# Patient Record
Sex: FEMALE | Race: WHITE | NOT HISPANIC OR LATINO | Employment: PART TIME | URBAN - METROPOLITAN AREA
[De-identification: names, ages, dates, MRNs, and addresses within clinical notes are randomized per-mention and may not be internally consistent; named-entity substitution may affect disease eponyms.]

---

## 2021-02-27 ENCOUNTER — HOSPITAL ENCOUNTER (EMERGENCY)
Facility: HOSPITAL | Age: 66
Discharge: HOME | End: 2021-02-28
Attending: EMERGENCY MEDICINE | Admitting: EMERGENCY MEDICINE
Payer: COMMERCIAL

## 2021-02-27 DIAGNOSIS — S91.119A TOE LACERATION: Primary | ICD-10-CM

## 2021-02-27 PROCEDURE — 99282 EMERGENCY DEPT VISIT SF MDM: CPT

## 2021-02-27 NOTE — Clinical Note
Jaida Alberts was seen and treated in our emergency department on 2/27/2021  Diagnosis:     Sera Alonso  may return to work on return date  She may return on this date: 03/07/2021         If you have any questions or concerns, please don't hesitate to call        Zacarias Coronado MD    ______________________________           _______________          _______________  Hospital Representative                              Date                                Time

## 2021-02-28 VITALS
SYSTOLIC BLOOD PRESSURE: 137 MMHG | HEART RATE: 87 BPM | OXYGEN SATURATION: 94 % | DIASTOLIC BLOOD PRESSURE: 62 MMHG | TEMPERATURE: 98 F | RESPIRATION RATE: 16 BRPM

## 2021-02-28 PROCEDURE — 99282 EMERGENCY DEPT VISIT SF MDM: CPT | Performed by: EMERGENCY MEDICINE

## 2021-02-28 RX ORDER — LIDOCAINE HYDROCHLORIDE 10 MG/ML
10 INJECTION, SOLUTION EPIDURAL; INFILTRATION; INTRACAUDAL; PERINEURAL ONCE
Status: COMPLETED | OUTPATIENT
Start: 2021-02-28 | End: 2021-02-28

## 2021-02-28 RX ADMIN — LIDOCAINE HYDROCHLORIDE 10 ML: 10 INJECTION, SOLUTION EPIDURAL; INFILTRATION; INTRACAUDAL; PERINEURAL at 00:30

## 2021-03-07 ENCOUNTER — HOSPITAL ENCOUNTER (EMERGENCY)
Facility: HOSPITAL | Age: 66
Discharge: HOME | End: 2021-03-07
Attending: EMERGENCY MEDICINE
Payer: COMMERCIAL

## 2021-03-07 VITALS
RESPIRATION RATE: 16 BRPM | TEMPERATURE: 98.6 F | HEART RATE: 90 BPM | WEIGHT: 148 LBS | DIASTOLIC BLOOD PRESSURE: 88 MMHG | OXYGEN SATURATION: 97 % | SYSTOLIC BLOOD PRESSURE: 135 MMHG

## 2021-03-07 DIAGNOSIS — Z48.02 VISIT FOR SUTURE REMOVAL: ICD-10-CM

## 2021-03-07 DIAGNOSIS — T14.8XXA HEMATOMA: Primary | ICD-10-CM

## 2021-03-07 PROCEDURE — 99283 EMERGENCY DEPT VISIT LOW MDM: CPT

## 2021-03-07 PROCEDURE — 99284 EMERGENCY DEPT VISIT MOD MDM: CPT | Performed by: EMERGENCY MEDICINE

## 2021-03-07 NOTE — Clinical Note
Moncho Charles was seen and treated in our emergency department on 3/7/2021  Diagnosis:     Santy Melendez    She may return on this date: 03/15/2021         If you have any questions or concerns, please don't hesitate to call        Huy Hernandez MD    ______________________________           _______________          _______________  Hospital Representative                              Date                                Time

## 2021-03-07 NOTE — ED PROVIDER NOTES
History  Chief Complaint   Patient presents with    Leg Swelling     c/o right leg swelling and brusing after fall and toe injury     61 yo female with fall and R leg injury approximately 10 days ago who comes to ED c/o 10 days of pain in R leg with progressively worsening swelling and bruising most pronounced in the R anterior leg/shin area  Has been walking on her leg and notes walking and standing on it worsens her discomfort  Pain is mild to moderate, worse with palpation and ambulation, very mild at rest and when not ambulating  No cp, sob, cough, hemoptysis, syncope or presyncope  No thigh swelling or pain  Not anticoagulated  Additional history per family member at bedside  None       History reviewed  No pertinent past medical history  History reviewed  No pertinent surgical history  History reviewed  No pertinent family history  I have reviewed and agree with the history as documented  E-Cigarette/Vaping     E-Cigarette/Vaping Substances     Social History     Tobacco Use    Smoking status: Never Smoker    Smokeless tobacco: Never Used   Substance Use Topics    Alcohol use: Not Currently    Drug use: Not Currently       Review of Systems   Cardiovascular: Positive for leg swelling  All other systems reviewed and are negative  Physical Exam  Physical Exam  Vitals signs and nursing note reviewed  Constitutional:       General: She is not in acute distress  Appearance: Normal appearance  She is well-developed  She is not ill-appearing, toxic-appearing or diaphoretic  HENT:      Head: Normocephalic and atraumatic  Eyes:      General:         Right eye: No discharge  Left eye: No discharge  Conjunctiva/sclera: Conjunctivae normal       Pupils: Pupils are equal, round, and reactive to light  Neck:      Musculoskeletal: Normal range of motion and neck supple  Vascular: No JVD  Pulmonary:      Effort: No respiratory distress  Breath sounds:  No stridor  Musculoskeletal: Normal range of motion  General: Swelling and tenderness present  No deformity  Right lower leg: Edema present  Comments: There is moderate swelling and tenderness as well as ecchymosis diffusely over the anterior R leg  The calf is soft and without evidence of compartment syndrome  There is a well healed wound at the plantar base of the 5th toe of the R foot  The R foot is warm and well perfused with normal sensation  There is no pain in the calf from passive ROM of the R ankle  Skin:     General: Skin is warm and dry  Capillary Refill: Capillary refill takes less than 2 seconds  Neurological:      General: No focal deficit present  Mental Status: She is alert and oriented to person, place, and time  Cranial Nerves: No cranial nerve deficit  Sensory: No sensory deficit  Motor: No abnormal muscle tone  Coordination: Coordination normal          Vital Signs  ED Triage Vitals [03/07/21 1449]   Temperature Pulse Respirations Blood Pressure SpO2   98 6 °F (37 °C) 90 16 135/88 97 %      Temp src Heart Rate Source Patient Position - Orthostatic VS BP Location FiO2 (%)   -- Monitor Sitting Left arm --      Pain Score       --           Vitals:    03/07/21 1449   BP: 135/88   Pulse: 90   Patient Position - Orthostatic VS: Sitting         Visual Acuity      ED Medications  Medications - No data to display    Diagnostic Studies  Results Reviewed     None                 No orders to display              Procedures  Procedures  2 sutures removed by me from base of R 5th toe  Pt tolerated procedure well  ED Course                                           MDM    Disposition  Final diagnoses:   Hematoma   Visit for suture removal     Time reflects when diagnosis was documented in both MDM as applicable and the Disposition within this note     Time User Action Codes Description Comment    3/7/2021  3:11 PM [place], [address] [place] [address]  8XXA] Hematoma 3/7/2021  3:11 PM Mehdi Barkley Add [Z48 02] Visit for suture removal       ED Disposition     ED Disposition Condition Date/Time Comment    Discharge Stable Sun Mar 7, 2021  3:11 PM Sarahi Christy discharge to home/self care  Follow-up Information    None         There are no discharge medications for this patient  No discharge procedures on file      PDMP Review     None          ED Provider  Electronically Signed by           Ash Gilliam MD  03/07/21 1699

## 2021-03-11 NOTE — ED PROVIDER NOTES
History  Chief Complaint   Patient presents with    Toe Laceration     Patient stated that she fell and cut her foot on a metal container  Noted laceration of right 5ft digit  bleeding controlled at this time  Tetanus update     61 yo female presenting to the emergency department for eval of toe injury  She had a mechanical fall and cut her right 5th toe on a sharp metal container  She has a laceration to the proximal ip joint of the toe  Bleeding is controlled and tetanus is utd  None       History reviewed  No pertinent past medical history  History reviewed  No pertinent surgical history  History reviewed  No pertinent family history  I have reviewed and agree with the history as documented  E-Cigarette/Vaping     E-Cigarette/Vaping Substances     Social History     Tobacco Use    Smoking status: Never Smoker    Smokeless tobacco: Never Used   Substance Use Topics    Alcohol use: Not Currently    Drug use: Not Currently       Review of Systems   Constitutional: Negative for appetite change, chills, fatigue and fever  HENT: Negative for sneezing and sore throat  Eyes: Negative for visual disturbance  Respiratory: Negative for cough, choking, chest tightness, shortness of breath and wheezing  Cardiovascular: Negative for chest pain and palpitations  Gastrointestinal: Negative for abdominal pain, constipation, diarrhea, nausea and vomiting  Genitourinary: Negative for difficulty urinating and dysuria  Skin: Positive for wound  Neurological: Negative for dizziness, weakness, light-headedness, numbness and headaches  All other systems reviewed and are negative  Physical Exam  Physical Exam  Vitals signs and nursing note reviewed  Constitutional:       General: She is not in acute distress  Appearance: She is well-developed  She is not diaphoretic  HENT:      Head: Normocephalic and atraumatic  Eyes:      Pupils: Pupils are equal, round, and reactive to light  Neck:      Vascular: No JVD  Trachea: No tracheal deviation  Cardiovascular:      Rate and Rhythm: Normal rate and regular rhythm  Heart sounds: Normal heart sounds  No murmur  No friction rub  No gallop  Pulmonary:      Effort: Pulmonary effort is normal  No respiratory distress  Breath sounds: Normal breath sounds  No wheezing or rales  Abdominal:      General: Bowel sounds are normal  There is no distension  Palpations: Abdomen is soft  Tenderness: There is no abdominal tenderness  There is no guarding or rebound  Skin:     General: Skin is warm and dry  Coloration: Skin is not pale  Comments: 1  Cm lac to base of r 5th toe   Neurological:      Mental Status: She is alert and oriented to person, place, and time  Cranial Nerves: No cranial nerve deficit  Motor: No abnormal muscle tone     Psychiatric:         Behavior: Behavior normal          Vital Signs  ED Triage Vitals [02/28/21 0002]   Temperature Pulse Respirations Blood Pressure SpO2   98 °F (36 7 °C) 87 16 137/62 94 %      Temp Source Heart Rate Source Patient Position - Orthostatic VS BP Location FiO2 (%)   Oral Monitor Sitting Right arm --      Pain Score       Worst Possible Pain           Vitals:    02/28/21 0002   BP: 137/62   Pulse: 87   Patient Position - Orthostatic VS: Sitting         Visual Acuity      ED Medications  Medications   lidocaine (PF) (XYLOCAINE-MPF) 1 % injection 10 mL (10 mL Infiltration Given 2/28/21 0030)       Diagnostic Studies  Results Reviewed     None                 No orders to display              Procedures  Laceration repair    Date/Time: 2/28/2021 1:30 AM  Performed by: Monisha Hollis MD  Authorized by: Monisha Hollis MD   Body area: lower extremity  Location details: right little toe  Laceration length: 1 cm  Foreign bodies: no foreign bodies  Tendon involvement: none  Nerve involvement: none  Vascular damage: no  Anesthesia: local infiltration    Anesthesia:  Local Anesthetic: lidocaine 1% without epinephrine    Wound Dehiscence:  Superficial Wound Dehiscence: simple closure      Procedure Details:  Irrigation solution: saline  Irrigation method: jet lavage  Amount of cleaning: standard  Debridement: none  Degree of undermining: none  Skin closure: 5-0 nylon  Number of sutures: 2  Technique: simple  Approximation: close  Approximation difficulty: simple  Dressing: 4x4 sterile gauze  Patient tolerance: patient tolerated the procedure well with no immediate complications               ED Course                             SBIRT 20yo+      Most Recent Value   SBIRT (22 yo +)   In order to provide better care to our patients, we are screening all of our patients for alcohol and drug use  Would it be okay to ask you these screening questions? No Filed at: 02/28/2021 0059   Initial Alcohol Screen: US AUDIT-C    1  How often do you have a drink containing alcohol?  0 Filed at: 02/28/2021 0059   2  How many drinks containing alcohol do you have on a typical day you are drinking? 0 Filed at: 02/28/2021 0059   3a  Male UNDER 65: How often do you have five or more drinks on one occasion? 0 Filed at: 02/28/2021 0059   3b  FEMALE Any Age, or MALE 65+: How often do you have 4 or more drinks on one occassion? 0 Filed at: 02/28/2021 0059   Audit-C Score  0 Filed at: 02/28/2021 0059                    [facility]  Number of Diagnoses or Management Options  Toe laceration:   Diagnosis management comments: 61 yo f with toe laceration repaired as described, will discharge with wound care instructions  Disposition  Final diagnoses:    Toe laceration     Time reflects when diagnosis was documented in both MDM as applicable and the Disposition within this note     Time User Action Codes Description Comment    2/28/2021  1:28 AM Belinda Sports Add [P14 688N] Toe laceration       ED Disposition     ED Disposition Condition Date/Time Comment    Discharge Stable Sun Feb 28, 2021  1:28 AM [place] discharge to home/self care  Follow-up Information     Follow up With Specialties Details Why Contact Info Additional Information    [facility] Emergency Department Emergency Medicine In 3 weeks For suture removal [facility] Emergency Department, [address] [place] [address], [id number]          There are no discharge medications for this patient  No discharge procedures on file      PDMP Review     None          ED Provider  Electronically Signed by           Sagrario Matthews MD  03/11/21 0105

## 2021-05-04 ENCOUNTER — HOSPITAL ENCOUNTER (EMERGENCY)
Facility: HOSPITAL | Age: 66
Discharge: HOME | End: 2021-05-04
Attending: EMERGENCY MEDICINE | Admitting: EMERGENCY MEDICINE
Payer: COMMERCIAL

## 2021-05-04 ENCOUNTER — APPOINTMENT (EMERGENCY)
Dept: RADIOLOGY | Facility: HOSPITAL | Age: 66
End: 2021-05-04
Payer: COMMERCIAL

## 2021-05-04 VITALS
OXYGEN SATURATION: 97 % | WEIGHT: 148.59 LBS | SYSTOLIC BLOOD PRESSURE: 142 MMHG | HEART RATE: 83 BPM | RESPIRATION RATE: 16 BRPM | TEMPERATURE: 98.4 F | DIASTOLIC BLOOD PRESSURE: 70 MMHG

## 2021-05-04 DIAGNOSIS — M77.8 TENDINITIS OF RIGHT SHOULDER: Primary | ICD-10-CM

## 2021-05-04 PROCEDURE — 99284 EMERGENCY DEPT VISIT MOD MDM: CPT | Performed by: NURSE PRACTITIONER

## 2021-05-04 PROCEDURE — 99283 EMERGENCY DEPT VISIT LOW MDM: CPT

## 2021-05-04 PROCEDURE — 73060 X-RAY EXAM OF HUMERUS: CPT

## 2021-05-04 RX ORDER — LIDOCAINE 50 MG/G
1 PATCH TOPICAL ONCE
Status: DISCONTINUED | OUTPATIENT
Start: 2021-05-04 | End: 2021-05-04 | Stop reason: HOSPADM

## 2021-05-04 RX ADMIN — LIDOCAINE 5% 1 PATCH: 700 PATCH TOPICAL at 11:35

## 2021-05-04 NOTE — ED PROVIDER NOTES
History  Chief Complaint   Patient presents with   Dinora Sharma Fall     pt states that she fell [de-identified] and c/o right arm pain since  denies any head injury or LOC     This is a pleasant 78-year-old female who presents here with a chief complaint of right shoulder pain and decreased range of motion due to pain since suffering from a fall on Saturday  She said falling for her is an occasional occurrence  She came today get her arm evaluated  She has tenderness of the upper arm limited range of motion because of pain  No obvious deformity  No distal edema  Reports that her arm was outstretched to brace her fall  Fall  Associated symptoms: no abdominal pain, no back pain, no chest pain, no headaches and no vomiting        None       Past Medical History:   Diagnosis Date    Asthma     COPD (chronic obstructive pulmonary disease) (HCC)     High cholesterol        Past Surgical History:   Procedure Laterality Date     SECTION      TONSILLECTOMY         History reviewed  No pertinent family history  I have reviewed and agree with the history as documented  E-Cigarette/Vaping    E-Cigarette Use Never User      E-Cigarette/Vaping Substances     Social History     Tobacco Use    Smoking status: Never Smoker    Smokeless tobacco: Never Used   Substance Use Topics    Alcohol use: Not Currently    Drug use: Not Currently       Review of Systems   Constitutional: Negative for diaphoresis, fatigue and fever  HENT: Negative for congestion, ear pain, nosebleeds and sore throat  Eyes: Negative for photophobia, pain, discharge and visual disturbance  Respiratory: Negative for cough, choking, chest tightness, shortness of breath and wheezing  Cardiovascular: Negative for chest pain and palpitations  Gastrointestinal: Negative for abdominal distention, abdominal pain, diarrhea and vomiting  Genitourinary: Negative for dysuria, flank pain and frequency     Musculoskeletal: Negative for back pain, gait problem and joint swelling  Skin: Negative for color change and rash  Neurological: Negative for dizziness, syncope and headaches  Psychiatric/Behavioral: Negative for behavioral problems and confusion  The patient is not nervous/anxious  All other systems reviewed and are negative  Physical Exam  Physical Exam  Vitals signs and nursing note reviewed  Constitutional:       General: She is not in acute distress  Appearance: She is well-developed  She is not ill-appearing or toxic-appearing  HENT:      Head: Normocephalic and atraumatic  Mouth/Throat:      Dentition: Normal dentition  Eyes:      General:         Right eye: No discharge  Left eye: No discharge  Neck:      Musculoskeletal: Normal range of motion and neck supple  Cardiovascular:      Rate and Rhythm: Normal rate and regular rhythm  Pulmonary:      Effort: Pulmonary effort is normal  No accessory muscle usage or respiratory distress  Abdominal:      General: There is no distension  Tenderness: There is no guarding  Musculoskeletal:      Right shoulder: She exhibits decreased range of motion (Due to pain, full passive range of motion), tenderness and pain  She exhibits no swelling and no deformity  Skin:     General: Skin is warm and dry  Neurological:      Mental Status: She is alert and oriented to person, place, and time  Coordination: Coordination normal    Psychiatric:         Behavior: Behavior is cooperative           Vital Signs  ED Triage Vitals [05/04/21 1011]   Temperature Pulse Respirations Blood Pressure SpO2   98 4 °F (36 9 °C) 84 16 144/74 97 %      Temp Source Heart Rate Source Patient Position - Orthostatic VS BP Location FiO2 (%)   Oral Monitor Sitting Left arm --      Pain Score       Worst Possible Pain           Vitals:    05/04/21 1011   BP: 144/74   Pulse: 84   Patient Position - Orthostatic VS: Sitting         Visual Acuity  Visual Acuity      Most Recent Value   L Pupil Size (mm)  4   R Pupil Size (mm)  4          ED Medications  Medications   lidocaine (LIDODERM) 5 % patch 1 patch (has no administration in time range)       Diagnostic Studies  Results Reviewed     None                 XR humerus RIGHT   ED Interpretation by SUE Arteaga (05/04 1122)   No acute osseous injury                 Procedures  Procedures         ED Course                             SBIRT 20yo+      Most Recent Value   SBIRT (23 yo +)   In order to provide better care to our patients, we are screening all of our patients for alcohol and drug use  Would it be okay to ask you these screening questions? Yes Filed at: 05/04/2021 1052   Initial Alcohol Screen: US AUDIT-C    1  How often do you have a drink containing alcohol?  0 Filed at: 05/04/2021 1052   2  How many drinks containing alcohol do you have on a typical day you are drinking? 0 Filed at: 05/04/2021 1052   3b  FEMALE Any Age, or MALE 65+: How often do you have 4 or more drinks on one occassion? 0 Filed at: 05/04/2021 1052   Audit-C Score  0 Filed at: 05/04/2021 1052   THEA: How many times in the past year have you    Used an illegal drug or used a prescription medication for non-medical reasons? Never Filed at: 05/04/2021 1052                    MDM  Number of Diagnoses or Management Options  Tendinitis of right shoulder: new and requires workup  Diagnosis management comments: No acute osseous injury on plain films  Given the mechanism of fall and distribution of pain likely has developed a shoulder tendinitis/rotator cuff tendinitis    Recommend follow-up with Orthopedics or Sports Medicine likely needs physical therapy       Amount and/or Complexity of Data Reviewed  Tests in the radiology section of CPT®: reviewed and ordered  Independent visualization of images, tracings, or specimens: yes    Patient Progress  Patient progress: stable      Disposition  Final diagnoses:   Tendinitis of right shoulder     Time reflects when diagnosis was documented in both MDM as applicable and the Disposition within this note     Time User Action Codes Description Comment    5/4/2021 11:26 AM [place]  Add [M77 8] Tendinitis of right shoulder       ED Disposition     ED Disposition Condition Date/Time Comment    Discharge Stable Tue May 4, 2021 11:26 AM Alessandro Truong discharge to home/self care  Follow-up Information     Follow up With Specialties Details Why [address], DO Sports Medicine Schedule an appointment as soon as possible for a visit  For Continued Evaluation [address] [phone]            Patient's Medications    No medications on file     No discharge procedures on file      PDMP Review     None          ED Provider  Electronically Signed by           SUE Rice  05/04/21 7991

## 2021-06-15 ENCOUNTER — OFFICE VISIT (OUTPATIENT)
Dept: OBGYN CLINIC | Facility: CLINIC | Age: 66
End: 2021-06-15
Payer: COMMERCIAL

## 2021-06-15 VITALS
SYSTOLIC BLOOD PRESSURE: 111 MMHG | DIASTOLIC BLOOD PRESSURE: 72 MMHG | HEIGHT: 61 IN | WEIGHT: 148 LBS | BODY MASS INDEX: 27.94 KG/M2 | HEART RATE: 78 BPM

## 2021-06-15 DIAGNOSIS — M24.811 INTERNAL DERANGEMENT OF RIGHT SHOULDER: ICD-10-CM

## 2021-06-15 DIAGNOSIS — M25.511 ACUTE PAIN OF RIGHT SHOULDER: ICD-10-CM

## 2021-06-15 DIAGNOSIS — S49.91XA INJURY OF RIGHT SHOULDER, INITIAL ENCOUNTER: Primary | ICD-10-CM

## 2021-06-15 PROCEDURE — 99203 OFFICE O/P NEW LOW 30 MIN: CPT | Performed by: ORTHOPAEDIC SURGERY

## 2021-06-15 RX ORDER — OMEPRAZOLE 40 MG/1
40 CAPSULE, DELAYED RELEASE ORAL DAILY
COMMUNITY

## 2021-06-15 RX ORDER — ROSUVASTATIN CALCIUM 40 MG/1
40 TABLET, COATED ORAL DAILY
COMMUNITY

## 2021-06-15 RX ORDER — MELATONIN
1000 DAILY
COMMUNITY

## 2021-06-15 RX ORDER — EZETIMIBE 10 MG/1
10 TABLET ORAL DAILY
COMMUNITY

## 2021-06-15 NOTE — PROGRESS NOTES
Orthopaedics Office Visit - New Patient Visit    ASSESSMENT/PLAN:    Assessment:   Suspected rotator cuff pathology, right     Plan:   Discussed PT/CSI and or MRI, declined PT and CSI    Bravo Marinelli elected to proceed with a right shoulder MRI, order was placed today, suspected rotator cuff pathology    Advised to continue to perform ROM/stretching exercises at home   MRI may be denied by insurance   Follow up once the MRI is complete     To Do Next Visit:  Review MRI results     _____________________________________________________  CHIEF COMPLAINT:  Chief Complaint   Patient presents with    Right Arm - Pain         SUBJECTIVE:  Maricruz Skinner is a 60 y o  female who presents who presents to the office today for right shoulder pain  Bravo Marinelli states on 2021 she had a fall directly onto her right shoulder  She presented to the ED a few days after the injury, at which time x-rays were performed and she was placed into a sling  Bravo Marinelli states that the sling caused increased pain  She notes diffuse right shoulder pain that radiates down her humerus  Pain will increase with forward flexion or with lifting  She states that she works for [employer] and does a lot of loading and unloading/lifting, which causes increased pain  Bravo Marinelli has tried OTC pain medication, without improvement in her symptoms  PAST MEDICAL HISTORY:  Past Medical History:   Diagnosis Date    Asthma     COPD (chronic obstructive pulmonary disease) ([address] )     High cholesterol        PAST SURGICAL HISTORY:  Past Surgical History:   Procedure Laterality Date     SECTION      TONSILLECTOMY         FAMILY HISTORY:  History reviewed  No pertinent family history      SOCIAL HISTORY:  Social History     Tobacco Use    Smoking status: Never Smoker    Smokeless tobacco: Never Used   Vaping Use    Vaping Use: Never used   Substance Use Topics    Alcohol use: Not Currently    Drug use: Not Currently       MEDICATIONS:    Current Outpatient Medications:     cholecalciferol (VITAMIN D3) 1,000 units tablet, Take 1,000 Units by mouth daily, Disp: , Rfl:     ezetimibe (ZETIA) 10 mg tablet, Take 10 mg by mouth daily, Disp: , Rfl:     omeprazole (PriLOSEC) 40 MG capsule, Take 40 mg by mouth daily, Disp: , Rfl:     rosuvastatin (CRESTOR) 40 MG tablet, Take 40 mg by mouth daily, Disp: , Rfl:     sertraline (ZOLOFT) 50 mg tablet, Take 50 mg by mouth daily, Disp: , Rfl:     ALLERGIES:  Allergies   Allergen Reactions    Lipitor [Atorvastatin] Anaphylaxis    Penicillin V Swelling    Adhesive [Medical Tape] Rash       REVIEW OF SYSTEMS:  MSK: right shoulder pain   Neuro: none   Pertinent items are otherwise noted in HPI  A comprehensive review of systems was otherwise negative  LABS:  HgA1c: No results found for: HGBA1C  BMP: No results found for: GLUCOSE, CALCIUM, NA, K, CO2, CL, BUN, CREATININE  CBC: No components found for: CBC    _____________________________________________________  PHYSICAL EXAMINATION:  Vital signs: /72   Pulse 78   Ht 5' 1" (1 549 m)   Wt 67 1 kg (148 lb)   BMI 27 96 kg/m²   General: No acute distress, awake and alert  Psychiatric: Mood and affect appear appropriate  HEENT: Trachea Midline, No torticollis, no apparent facial trauma  Cardiovascular: No audible murmurs; Extremities appear perfused  Pulmonary: No audible wheezing or stridor  Skin: No open lesions; see further details (if any) below    MUSCULOSKELETAL EXAMINATION:    Extremities:  Right shoulder  No erythema, ecchymosis or edema  Skin intact   Full passive forward flexion and shoulder abduction   Pain with ROM   Abduction strength in scapular plane 5/5  2+ radial pulse   Skin warm and well perfused        _____________________________________________________  STUDIES REVIEWED:  I personally reviewed the images and interpretation is as follows: x-ray of the right humerus obtained on 5/4/2021 demonstrates no acute fracture or dislocation         PROCEDURES PERFORMED:  Procedures    Scribe Attestation    I,:  Valorie Navarro am acting as a scribe while in the presence of the attending physician :       I,:  Angus Morton MD personally performed the services described in this documentation    as scribed in my presence :

## 2021-06-16 PROBLEM — S49.91XA INJURY OF RIGHT SHOULDER: Status: ACTIVE | Noted: 2021-06-16

## 2021-06-24 ENCOUNTER — HOSPITAL ENCOUNTER (OUTPATIENT)
Dept: MRI IMAGING | Facility: CLINIC | Age: 66
Discharge: HOME | End: 2021-06-24
Payer: COMMERCIAL

## 2021-06-24 DIAGNOSIS — M25.511 ACUTE PAIN OF RIGHT SHOULDER: ICD-10-CM

## 2021-06-24 DIAGNOSIS — M24.811 INTERNAL DERANGEMENT OF RIGHT SHOULDER: ICD-10-CM

## 2021-06-24 DIAGNOSIS — S49.91XA INJURY OF RIGHT SHOULDER, INITIAL ENCOUNTER: ICD-10-CM

## 2021-06-24 PROCEDURE — 73221 MRI JOINT UPR EXTREM W/O DYE: CPT

## 2021-06-24 PROCEDURE — G1004 CDSM NDSC: HCPCS

## 2021-06-25 ENCOUNTER — TELEPHONE (OUTPATIENT)
Dept: OBGYN CLINIC | Facility: CLINIC | Age: 66
End: 2021-06-25

## 2021-06-25 NOTE — TELEPHONE ENCOUNTER
Patient sees Dr Giovanni Umana    Patient would like a callback as soon as her MRI results come it   Thank you         [phone]

## 2021-06-28 NOTE — TELEPHONE ENCOUNTER
Patient called back stating she had a missed call, she thinks it was in regsrds to her MRI results      C/b # [id number]

## 2021-06-29 ENCOUNTER — TELEPHONE (OUTPATIENT)
Dept: OBGYN CLINIC | Facility: HOSPITAL | Age: 66
End: 2021-06-29

## 2021-06-29 ENCOUNTER — OFFICE VISIT (OUTPATIENT)
Dept: OBGYN CLINIC | Facility: CLINIC | Age: 66
End: 2021-06-29
Payer: COMMERCIAL

## 2021-06-29 VITALS
DIASTOLIC BLOOD PRESSURE: 74 MMHG | WEIGHT: 148 LBS | HEART RATE: 76 BPM | SYSTOLIC BLOOD PRESSURE: 120 MMHG | BODY MASS INDEX: 27.94 KG/M2 | HEIGHT: 61 IN

## 2021-06-29 DIAGNOSIS — M67.813 TENDINOSIS OF RIGHT SHOULDER: Primary | ICD-10-CM

## 2021-06-29 PROCEDURE — 99214 OFFICE O/P EST MOD 30 MIN: CPT | Performed by: ORTHOPAEDIC SURGERY

## 2021-06-29 PROCEDURE — 20610 DRAIN/INJ JOINT/BURSA W/O US: CPT | Performed by: ORTHOPAEDIC SURGERY

## 2021-06-29 RX ORDER — BUPIVACAINE HYDROCHLORIDE 2.5 MG/ML
2 INJECTION, SOLUTION INFILTRATION; PERINEURAL
Status: COMPLETED | OUTPATIENT
Start: 2021-06-29 | End: 2021-06-29

## 2021-06-29 RX ORDER — METHYLPREDNISOLONE ACETATE 40 MG/ML
2 INJECTION, SUSPENSION INTRA-ARTICULAR; INTRALESIONAL; INTRAMUSCULAR; SOFT TISSUE
Status: COMPLETED | OUTPATIENT
Start: 2021-06-29 | End: 2021-06-29

## 2021-06-29 RX ADMIN — BUPIVACAINE HYDROCHLORIDE 2 ML: 2.5 INJECTION, SOLUTION INFILTRATION; PERINEURAL at 08:26

## 2021-06-29 RX ADMIN — METHYLPREDNISOLONE ACETATE 2 ML: 40 INJECTION, SUSPENSION INTRA-ARTICULAR; INTRALESIONAL; INTRAMUSCULAR; SOFT TISSUE at 08:26

## 2021-06-29 NOTE — PROGRESS NOTES
Orthopaedics Office Visit - Follow-up Patient Visit    ASSESSMENT/PLAN:    Assessment:   Supraspinatus and Infraspinatus Tendinosis    Plan:   -MRI was discussed with the patient which demonstrated right shoulder rotator cuff tendinosis  -We discussed the risks and benefits of corticosteroid injection today in the office and she did elect to proceed  The right shoulder injection was administered without issue and well tolerated by the patient  Post injection instructions were provided  She understands can be repeated no sooner than three months   -Script for physical therapy provided to the patient     To Do Next Visit:  Follow-up in 6 weeks for a clinical re-evaluation    _____________________________________________________  CHIEF COMPLAINT:  Chief Complaint   Patient presents with    Right Shoulder - Follow-up       SUBJECTIVE:  Kelle Talavera is a 60 y o  female who presents to the office today for a follow-up evaluation of her right shoulder and MRI review  She states that she is still experiencing anterior shoulder pain especially with lifting heavy objects  She states that she has tried over-the-counter medication without any improvement in her symptoms  PAST MEDICAL HISTORY:  Past Medical History:   Diagnosis Date    Asthma     COPD (chronic obstructive pulmonary disease) ([facility] [address] )     High cholesterol        PAST SURGICAL HISTORY:  Past Surgical History:   Procedure Laterality Date     SECTION      TONSILLECTOMY         FAMILY HISTORY:  No family history on file      SOCIAL HISTORY:  Social History     Tobacco Use    Smoking status: Never Smoker    Smokeless tobacco: Never Used   Vaping Use    Vaping Use: Never used   Substance Use Topics    Alcohol use: Not Currently    Drug use: Not Currently       MEDICATIONS:    Current Outpatient Medications:     cholecalciferol (VITAMIN D3) 1,000 units tablet, Take 1,000 Units by mouth daily, Disp: , Rfl:     ezetimibe (ZETIA) 10 mg tablet, Take 10 mg by mouth daily, Disp: , Rfl:     omeprazole (PriLOSEC) 40 MG capsule, Take 40 mg by mouth daily, Disp: , Rfl:     rosuvastatin (CRESTOR) 40 MG tablet, Take 40 mg by mouth daily, Disp: , Rfl:     sertraline (ZOLOFT) 50 mg tablet, Take 50 mg by mouth daily, Disp: , Rfl:     ALLERGIES:  Allergies   Allergen Reactions    Lipitor [Atorvastatin] Anaphylaxis    Penicillin V Swelling    Adhesive [Medical Tape] Rash       REVIEW OF SYSTEMS:  MSK: Right Shoulder  Neuro: Intact  Pertinent items are otherwise noted in HPI  A comprehensive review of systems was otherwise negative  LABS:  HgA1c: No results found for: HGBA1C  BMP: No results found for: GLUCOSE, CALCIUM, NA, K, CO2, CL, BUN, CREATININE  CBC: No components found for: CBC    _____________________________________________________  PHYSICAL EXAMINATION:  Vital signs: There were no vitals taken for this visit  General: No acute distress, awake and alert  Psychiatric: Mood and affect appear appropriate  HEENT: Trachea Midline, No torticollis, no apparent facial trauma  Cardiovascular: No audible murmurs; Extremities appear perfused  Pulmonary: No audible wheezing or stridor  Skin: No open lesions; see further details (if any) below    MUSCULOSKELETAL EXAMINATION:  Extremities:  Right Shoulder  No erythema, ecchymosis or edema  Skin intact   Full passive forward flexion and shoulder abduction   Pain with ROM   Abduction strength in scapular plane 5/5  2+ radial pulse   Skin warm and well perfused     _____________________________________________________  STUDIES REVIEWED:  I personally reviewed the images and interpretation is as follows: MRI performed on 06/24/2021 of her right shoulder demonstrates mild supraspinatus infraspinatus tendinosis without rotator cuff tear  There is a moderate subacromial/subdeltoid bursitis  PROCEDURES PERFORMED:  Large joint arthrocentesis: R subacromial bursa  Universal Protocol:  Consent: Verbal consent obtained    Risks and benefits: risks, benefits and alternatives were discussed  Consent given by: patient  Site marked: the operative site was marked  Procedure Details  Location: shoulder - R subacromial bursa  Needle size: 22 G  Ultrasound guidance: no  Approach: anterolateral  Medications administered: 2 mL bupivacaine 0 25 %; 2 mL methylPREDNISolone acetate 40 mg/mL    Patient tolerance: patient tolerated the procedure well with no immediate complications  Dressing:  Sterile dressing applied          Scribe Attestation    I,:  Kali Toledo am acting as a scribe while in the presence of the attending physician :       I,:  Otilia Solo MD personally performed the services described in this documentation    as scribed in my presence :

## 2021-06-29 NOTE — TELEPHONE ENCOUNTER
Patient sees Jaskaran Burden    Patient called to see where she can go to PT, I was able to give patient some locations and she was ok

## 2021-11-16 ENCOUNTER — OFFICE VISIT (OUTPATIENT)
Dept: OBGYN CLINIC | Facility: CLINIC | Age: 66
End: 2021-11-16
Payer: COMMERCIAL

## 2021-11-16 VITALS
HEIGHT: 61 IN | SYSTOLIC BLOOD PRESSURE: 129 MMHG | WEIGHT: 157.4 LBS | HEART RATE: 97 BPM | BODY MASS INDEX: 29.72 KG/M2 | DIASTOLIC BLOOD PRESSURE: 78 MMHG

## 2021-11-16 DIAGNOSIS — M67.813 TENDINOSIS OF RIGHT SHOULDER: Primary | ICD-10-CM

## 2021-11-16 PROCEDURE — 99213 OFFICE O/P EST LOW 20 MIN: CPT | Performed by: ORTHOPAEDIC SURGERY

## 2021-11-16 PROCEDURE — 20610 DRAIN/INJ JOINT/BURSA W/O US: CPT | Performed by: ORTHOPAEDIC SURGERY

## 2021-11-16 RX ADMIN — BUPIVACAINE HYDROCHLORIDE 2 ML: 2.5 INJECTION, SOLUTION INFILTRATION; PERINEURAL at 14:03

## 2021-11-16 RX ADMIN — METHYLPREDNISOLONE ACETATE 2 ML: 40 INJECTION, SUSPENSION INTRA-ARTICULAR; INTRALESIONAL; INTRAMUSCULAR; SOFT TISSUE at 14:03

## 2021-11-17 RX ORDER — BUPIVACAINE HYDROCHLORIDE 2.5 MG/ML
2 INJECTION, SOLUTION INFILTRATION; PERINEURAL
Status: COMPLETED | OUTPATIENT
Start: 2021-11-16 | End: 2021-11-16

## 2021-11-17 RX ORDER — METHYLPREDNISOLONE ACETATE 40 MG/ML
2 INJECTION, SUSPENSION INTRA-ARTICULAR; INTRALESIONAL; INTRAMUSCULAR; SOFT TISSUE
Status: COMPLETED | OUTPATIENT
Start: 2021-11-16 | End: 2021-11-16

## 2022-03-29 ENCOUNTER — OFFICE VISIT (OUTPATIENT)
Dept: OBGYN CLINIC | Facility: CLINIC | Age: 67
End: 2022-03-29
Payer: COMMERCIAL

## 2022-03-29 VITALS — WEIGHT: 157 LBS | HEIGHT: 61 IN | BODY MASS INDEX: 29.64 KG/M2

## 2022-03-29 DIAGNOSIS — M67.813 TENDINOSIS OF RIGHT SHOULDER: Primary | ICD-10-CM

## 2022-03-29 PROCEDURE — 99213 OFFICE O/P EST LOW 20 MIN: CPT | Performed by: ORTHOPAEDIC SURGERY

## 2022-03-29 PROCEDURE — 20610 DRAIN/INJ JOINT/BURSA W/O US: CPT | Performed by: ORTHOPAEDIC SURGERY

## 2022-03-29 RX ORDER — BUPIVACAINE HYDROCHLORIDE 2.5 MG/ML
2 INJECTION, SOLUTION INFILTRATION; PERINEURAL
Status: COMPLETED | OUTPATIENT
Start: 2022-03-29 | End: 2022-03-29

## 2022-03-29 RX ORDER — BETAMETHASONE SODIUM PHOSPHATE AND BETAMETHASONE ACETATE 3; 3 MG/ML; MG/ML
12 INJECTION, SUSPENSION INTRA-ARTICULAR; INTRALESIONAL; INTRAMUSCULAR; SOFT TISSUE
Status: COMPLETED | OUTPATIENT
Start: 2022-03-29 | End: 2022-03-29

## 2022-03-29 RX ADMIN — BETAMETHASONE SODIUM PHOSPHATE AND BETAMETHASONE ACETATE 12 MG: 3; 3 INJECTION, SUSPENSION INTRA-ARTICULAR; INTRALESIONAL; INTRAMUSCULAR; SOFT TISSUE at 10:20

## 2022-03-29 RX ADMIN — BUPIVACAINE HYDROCHLORIDE 2 ML: 2.5 INJECTION, SOLUTION INFILTRATION; PERINEURAL at 10:20

## 2022-03-29 NOTE — PROGRESS NOTES
Orthopaedics Office Visit - Follow up Patient Visit    ASSESSMENT/PLAN:    Assessment:   Right shoulder Supraspinatus and Infraspinatus Tendinosis  Cortisone injection administered in office today       Plan:   - Discussed conservative treatment with patient at length  - Offered patient cortisone injection  Patient accepted and tolerated well  - Begin physical therapy as directed   - Over the counter analgesics as needed / directed   - Ice / heat as directed   - Follow up 3 months       To Do Next Visit:  Evaluate bilateral shoulder pain     _____________________________________________________  CHIEF COMPLAINT:  Chief Complaint   Patient presents with    Right Shoulder - Follow-up         SUBJECTIVE:  Brigette Combs is a 73 y o  female who presents to the office for follow-up evaluation of her right shoulder  Patient is 4 months status post cortisone injection to the right shoulder  Patient states that the patient states that she received great relief from the last cortisone injection  Patient states that over the past 2 weeks she has begun to experience similar pain to prior to her last cortisone injection  Patient states the pain is diffuse throughout the shoulder and becomes worse with range of motion of the shoulder and overhead activities  The patient is requesting another cortisone injection  Patient offers no other complaints at this time  PAST MEDICAL HISTORY:  Past Medical History:   Diagnosis Date    Asthma     COPD (chronic obstructive pulmonary disease) ([facility] [address]     High cholesterol        PAST SURGICAL HISTORY:  Past Surgical History:   Procedure Laterality Date     SECTION      TONSILLECTOMY         FAMILY HISTORY:  No family history on file      SOCIAL HISTORY:  Social History     Tobacco Use    Smoking status: Never Smoker    Smokeless tobacco: Never Used   Vaping Use    Vaping Use: Never used   Substance Use Topics    Alcohol use: Not Currently    Drug use: Not Currently MEDICATIONS:    Current Outpatient Medications:     cholecalciferol (VITAMIN D3) 1,000 units tablet, Take 1,000 Units by mouth daily, Disp: , Rfl:     ezetimibe (ZETIA) 10 mg tablet, Take 10 mg by mouth daily, Disp: , Rfl:     omeprazole (PriLOSEC) 40 MG capsule, Take 40 mg by mouth daily, Disp: , Rfl:     rosuvastatin (CRESTOR) 40 MG tablet, Take 40 mg by mouth daily, Disp: , Rfl:     sertraline (ZOLOFT) 50 mg tablet, Take 50 mg by mouth daily, Disp: , Rfl:     ALLERGIES:  Allergies   Allergen Reactions    Lipitor [Atorvastatin] Anaphylaxis    Penicillin V Swelling    Adhesive [Medical Tape] Rash       REVIEW OF SYSTEMS:  MSK: right shoulder pain   Neuro: WNL   Pertinent items are otherwise noted in HPI  A comprehensive review of systems was otherwise negative  LABS:  HgA1c: No results found for: HGBA1C  BMP: No results found for: GLUCOSE, CALCIUM, NA, K, CO2, CL, BUN, CREATININE  CBC: No components found for: CBC    _____________________________________________________  PHYSICAL EXAMINATION:  Vital signs: Ht 5' 1" (1 549 m)   Wt 71 2 kg (157 lb)   BMI 29 66 kg/m²   General: No acute distress, awake and alert  Psychiatric: Mood and affect appear appropriate  HEENT: Trachea Midline, No torticollis, no apparent facial trauma  Cardiovascular: No audible murmurs; Extremities appear perfused  Pulmonary: No audible wheezing or stridor  Skin: No open lesions; see further details (if any) below      MUSCULOSKELETAL EXAMINATION:  Right shoulder examination:  - Patient sitting comfortably in the office in no acute distress   - no acute visible abnormalities present in the right shoulder  Extremity appears to be well perfused overall  - tenderness to palpation noted over the greater tuberosity  No other bony or soft tissue tenderness to palpation noted at this time    - full range of motion of the right shoulder noted with pain associated at terminal forward flexion and abduction  - 5/5 strength noted in all planes of motion of the right shoulder  - NV intact    _____________________________________________________  STUDIES REVIEWED:  I personally reviewed the images and interpretation is as follows:  N/A       PROCEDURES PERFORMED:  Large joint arthrocentesis: R subacromial bursa  Universal Protocol:  Consent: Verbal consent obtained  Risks and benefits: risks, benefits and alternatives were discussed  Consent given by: patient  Patient understanding: patient states understanding of the procedure being performed  Site marked: the operative site was marked  Supporting Documentation  Indications: pain   Procedure Details  Location: shoulder - R subacromial bursa  Needle size: 22 G  Ultrasound guidance: no  Approach: posterior  Medications administered: 2 mL bupivacaine 0 25 %; 12 mg betamethasone acetate-betamethasone sodium phosphate 6 (3-3) mg/mL    Patient tolerance: patient tolerated the procedure well with no immediate complications  Dressing:  Sterile dressing applied            Sabina Webber PA-C - assisting    Mariia Roland MD          Portions of the record may have been created with voice recognition software  Occasional wrong word or "sound a like" substitutions may have occurred due to the inherent limitations of voice recognition software  Read the chart carefully and recognize, using context, where substitutions have occurred

## 2023-06-15 ENCOUNTER — TELEPHONE (OUTPATIENT)
Dept: OBGYN CLINIC | Facility: HOSPITAL | Age: 68
End: 2023-06-15

## 2023-06-15 NOTE — TELEPHONE ENCOUNTER
Caller: Patient    Doctor: Brooklyn Murphy    Reason for call: Patient is calling to schedule an appt for a cortisone injection in the [facility] office, I offered her first available 6/27 but she stated when she needs one she is told just to call and she be squeezed in and to check with Dr Brooklyn Murphy or Kevin Hernandez  Please advise      Call back#: [phone]

## 2023-06-16 NOTE — TELEPHONE ENCOUNTER
Called and spoke w/pt and informed of Dr Chalino Willoughby  Appt given 6/27/23 at [facility] for CSI in Right shoulder per Dr Severiano Africa  Pt's last CSI in Right shoulder was 3/29/23

## 2023-06-27 ENCOUNTER — OFFICE VISIT (OUTPATIENT)
Dept: OBGYN CLINIC | Facility: CLINIC | Age: 68
End: 2023-06-27
Payer: COMMERCIAL

## 2023-06-27 VITALS
DIASTOLIC BLOOD PRESSURE: 70 MMHG | WEIGHT: 157 LBS | HEART RATE: 67 BPM | SYSTOLIC BLOOD PRESSURE: 115 MMHG | HEIGHT: 61 IN | BODY MASS INDEX: 29.64 KG/M2

## 2023-06-27 DIAGNOSIS — M75.82 TENDONITIS OF LEFT ROTATOR CUFF: Primary | ICD-10-CM

## 2023-06-27 PROCEDURE — 20610 DRAIN/INJ JOINT/BURSA W/O US: CPT | Performed by: ORTHOPAEDIC SURGERY

## 2023-06-27 PROCEDURE — 99213 OFFICE O/P EST LOW 20 MIN: CPT | Performed by: ORTHOPAEDIC SURGERY

## 2023-06-27 RX ORDER — BUPIVACAINE HYDROCHLORIDE 2.5 MG/ML
2 INJECTION, SOLUTION INFILTRATION; PERINEURAL
Status: COMPLETED | OUTPATIENT
Start: 2023-06-27 | End: 2023-06-27

## 2023-06-27 RX ORDER — BETAMETHASONE SODIUM PHOSPHATE AND BETAMETHASONE ACETATE 3; 3 MG/ML; MG/ML
12 INJECTION, SUSPENSION INTRA-ARTICULAR; INTRALESIONAL; INTRAMUSCULAR; SOFT TISSUE
Status: COMPLETED | OUTPATIENT
Start: 2023-06-27 | End: 2023-06-27

## 2023-06-27 RX ADMIN — BETAMETHASONE SODIUM PHOSPHATE AND BETAMETHASONE ACETATE 12 MG: 3; 3 INJECTION, SUSPENSION INTRA-ARTICULAR; INTRALESIONAL; INTRAMUSCULAR; SOFT TISSUE at 11:15

## 2023-06-27 RX ADMIN — BUPIVACAINE HYDROCHLORIDE 2 ML: 2.5 INJECTION, SOLUTION INFILTRATION; PERINEURAL at 11:15

## 2023-06-27 NOTE — PROGRESS NOTES
Orthopaedics Office Visit - Established Patient Visit    ASSESSMENT/PLAN:    Assessment:   Left shoulder rotator cuff tendinitis   Previous relief with right shoulder CSI for RTC tendinitis      Plan:   · The patient was offered a corticosteroid injection for their left shoulder  They tolerated the procedure well  · The patient was educated they may have some irritation in the next few days and should rest, ice, elevate and perform gentle range of motion exercises  They were advised the medicine should begin to work in a few days time  · In the future consider referring to physical therapy  · The patient is encouraged to ask her cardiologist if the restrictions to her left upper extremity are permanent  · Follow-up as needed     To Do Next Visit:  PRN    _____________________________________________________  CHIEF COMPLAINT:  Chief Complaint   Patient presents with   • Left Shoulder - Follow-up   • Right Shoulder - Follow-up         SUBJECTIVE:  John Solis is a 78 y o  female who presents for initial evaluation of left shoulder  She denies any injuries to her left shoulder  Pain has been present for about 1 month  Pain is rated a 9/10 and is described as constant, sharp with movement  The patient denies any numbness or tingling  The patient is a diabetic and her last A1c was on 23 resulting in a 6 0  The patient is not a smoker  She had a pacemaker placed on 2/10/22 and has restrictions with overhead motion  PAST MEDICAL HISTORY:  Past Medical History:   Diagnosis Date   • Asthma    • COPD (chronic obstructive pulmonary disease) ([address] )    • High cholesterol        PAST SURGICAL HISTORY:  Past Surgical History:   Procedure Laterality Date   •  SECTION     • TONSILLECTOMY         FAMILY HISTORY:  History reviewed  No pertinent family history      SOCIAL HISTORY:  Social History     Tobacco Use   • Smoking status: Never   • Smokeless tobacco: Never   Vaping Use   • Vaping Use: Never used "  Substance Use Topics   • Alcohol use: Not Currently   • Drug use: Not Currently       MEDICATIONS:    Current Outpatient Medications:   •  cholecalciferol (VITAMIN D3) 1,000 units tablet, Take 1,000 Units by mouth daily, Disp: , Rfl:   •  ezetimibe (ZETIA) 10 mg tablet, Take 10 mg by mouth daily, Disp: , Rfl:   •  omeprazole (PriLOSEC) 40 MG capsule, Take 40 mg by mouth daily, Disp: , Rfl:   •  rosuvastatin (CRESTOR) 40 MG tablet, Take 40 mg by mouth daily, Disp: , Rfl:   •  sertraline (ZOLOFT) 50 mg tablet, Take 50 mg by mouth daily, Disp: , Rfl:     ALLERGIES:  Allergies   Allergen Reactions   • Lipitor [Atorvastatin] Anaphylaxis   • Penicillin V Swelling   • Adhesive [Medical Tape] Rash       REVIEW OF SYSTEMS:  MSK: left shoulder  Neuro: WNL  Pertinent items are otherwise noted in HPI  A comprehensive review of systems was otherwise negative  LABS:  HgA1c:   Lab Results   Component Value Date    HGBA1C 6 0 (H) 01/16/2023     BMP: No results found for: \"GLUCOSE\", \"CALCIUM\", \"NA\", \"K\", \"CO2\", \"CL\", \"BUN\", \"CREATININE\"  CBC: No components found for: \"CBC\"    _____________________________________________________  PHYSICAL EXAMINATION:  Vital signs: /70   Pulse 67   Ht 5' 1\" (1 549 m)   Wt 71 2 kg (157 lb)   BMI 29 66 kg/m²   General: No acute distress, awake and alert  Psychiatric: Mood and affect appear appropriate  HEENT: Trachea Midline, No torticollis, no apparent facial trauma  Cardiovascular: No audible murmurs; Extremities appear perfused  Pulmonary: No audible wheezing or stridor  Skin: No open lesions; see further details (if any) below    MUSCULOSKELETAL EXAMINATION:  Extremities:    Left  Shoulder: Active range of motion   100 degrees forward flexion  110 degrees abduction  60 degrees external rotation   2 level restriction internal rotation       No anatomical deformity    There is tenderness present over the biceps tendon, greater truberosity     There is 4+/5 strength with " "supraspinatus testing  There is 4+/5 strength with infraspinatus testing  There is 5/5 strength with subscapularis testing  Núñez test is positive  Fingers are pink and mobile  Compartments are soft  Brisk capillary refill  Sensation is intact   The patient is neurovascularly intact distally in the extremity  _____________________________________________________  STUDIES REVIEWED:  I personally reviewed the images and interpretation is as follows: No new images today  PROCEDURES PERFORMED:  Large joint arthrocentesis: L subacromial bursa  Universal Protocol:  Consent: Verbal consent obtained  Risks and benefits: risks, benefits and alternatives were discussed  Consent given by: patient  Time out: Immediately prior to procedure a \"time out\" was called to verify the correct patient, procedure, equipment, support staff and site/side marked as required  Timeout called at: 6/27/2023 12:05 PM   Patient understanding: patient states understanding of the procedure being performed  Site marked: the operative site was marked  Patient identity confirmed: verbally with patient    Supporting Documentation  Indications: pain   Procedure Details  Location: shoulder - L subacromial bursa  Preparation: Patient was prepped and draped in the usual sterile fashion  Needle size: 22 G  Ultrasound guidance: no  Approach: posterolateral  Medications administered: 2 mL bupivacaine 0 25 %; 12 mg betamethasone acetate-betamethasone sodium phosphate 6 (3-3) mg/mL    Patient tolerance: patient tolerated the procedure well with no immediate complications  Dressing:  Sterile dressing applied            Scribe Attestation    I,:  Flaquita Lopez am acting as a scribe while in the presence of the attending physician :       I,:  Bishop Marroquin MD personally performed the services described in this documentation    as scribed in my presence  :           "

## 2023-10-03 ENCOUNTER — OFFICE VISIT (OUTPATIENT)
Dept: OBGYN CLINIC | Facility: CLINIC | Age: 68
End: 2023-10-03
Payer: COMMERCIAL

## 2023-10-03 VITALS
HEIGHT: 61 IN | DIASTOLIC BLOOD PRESSURE: 72 MMHG | SYSTOLIC BLOOD PRESSURE: 117 MMHG | HEART RATE: 73 BPM | WEIGHT: 157.4 LBS | BODY MASS INDEX: 29.72 KG/M2

## 2023-10-03 DIAGNOSIS — M75.82 TENDONITIS OF LEFT ROTATOR CUFF: Primary | ICD-10-CM

## 2023-10-03 DIAGNOSIS — S40.012A CONTUSION OF LEFT SHOULDER, INITIAL ENCOUNTER: ICD-10-CM

## 2023-10-03 PROCEDURE — 99213 OFFICE O/P EST LOW 20 MIN: CPT | Performed by: ORTHOPAEDIC SURGERY

## 2023-10-03 PROCEDURE — 20610 DRAIN/INJ JOINT/BURSA W/O US: CPT | Performed by: ORTHOPAEDIC SURGERY

## 2023-10-03 RX ORDER — BETAMETHASONE SODIUM PHOSPHATE AND BETAMETHASONE ACETATE 3; 3 MG/ML; MG/ML
12 INJECTION, SUSPENSION INTRA-ARTICULAR; INTRALESIONAL; INTRAMUSCULAR; SOFT TISSUE
Status: COMPLETED | OUTPATIENT
Start: 2023-10-03 | End: 2023-10-03

## 2023-10-03 RX ORDER — BUPIVACAINE HYDROCHLORIDE 2.5 MG/ML
2 INJECTION, SOLUTION INFILTRATION; PERINEURAL
Status: COMPLETED | OUTPATIENT
Start: 2023-10-03 | End: 2023-10-03

## 2023-10-03 RX ADMIN — BUPIVACAINE HYDROCHLORIDE 2 ML: 2.5 INJECTION, SOLUTION INFILTRATION; PERINEURAL at 10:30

## 2023-10-03 RX ADMIN — BETAMETHASONE SODIUM PHOSPHATE AND BETAMETHASONE ACETATE 12 MG: 3; 3 INJECTION, SUSPENSION INTRA-ARTICULAR; INTRALESIONAL; INTRAMUSCULAR; SOFT TISSUE at 10:30

## 2023-10-03 NOTE — PROGRESS NOTES
Orthopaedics Office Visit - Follow up Patient Visit    ASSESSMENT/PLAN:    Assessment:   Left shoulder rotator cuff tendinitis however minimal improvement with SA injection  Left upper extremity recent fall, exacerbated symptoms      Plan:   WBAT LUE  Administered LUE GH joint injection given no relief from previous injection  Start PT for L shoulder modalities, advised important to attend PT, particularly if need for MRI in future  - Over the counter analgesics as needed / directed   - Ice / heat as directed         To Do Next Visit:  eval shoulder symptoms    _____________________________________________________  CHIEF COMPLAINT:  Chief Complaint   Patient presents with   • Left Shoulder - Follow-up         SUBJECTIVE:  Prasanna Jauregui is a 80 y.o. female who presents for a follow up for her Left shoulder rotator cuff tendinitis. Patient is 3 months s/p cortisone injection for the left shoulder. Patient states that her shoulder was doing fine until she sustained a fall landing on her left side at the end of August.  Patient states that since her fall she has been experiencing severe left shoulder and upper arm pain which becomes worse with range of motion of the shoulder and elbow. Patient states her pain is generally constant in nature. Patient states she has been icing/heating the shoulder and taking over-the-counter pain medication provides no relief of symptoms. Patient states that she is unable to raise her arm above her head. Patient denies any numbness or tingling her hand. Patient offers no other complaints at this time. PAST MEDICAL HISTORY:  Past Medical History:   Diagnosis Date   • Asthma    • COPD (chronic obstructive pulmonary disease) ([address])    • High cholesterol        PAST SURGICAL HISTORY:  Past Surgical History:   Procedure Laterality Date   •  SECTION     • TONSILLECTOMY         FAMILY HISTORY:  History reviewed. No pertinent family history.     SOCIAL HISTORY:  Social History Tobacco Use   • Smoking status: Never   • Smokeless tobacco: Never   Vaping Use   • Vaping Use: Never used   Substance Use Topics   • Alcohol use: Not Currently   • Drug use: Not Currently       MEDICATIONS:    Current Outpatient Medications:   •  cholecalciferol (VITAMIN D3) 1,000 units tablet, Take 1,000 Units by mouth daily, Disp: , Rfl:   •  ezetimibe (ZETIA) 10 mg tablet, Take 10 mg by mouth daily, Disp: , Rfl:   •  omeprazole (PriLOSEC) 40 MG capsule, Take 40 mg by mouth daily, Disp: , Rfl:   •  rosuvastatin (CRESTOR) 40 MG tablet, Take 40 mg by mouth daily, Disp: , Rfl:   •  sertraline (ZOLOFT) 50 mg tablet, Take 50 mg by mouth daily, Disp: , Rfl:     ALLERGIES:  Allergies   Allergen Reactions   • Lipitor [Atorvastatin] Anaphylaxis   • Penicillin V Swelling   • Adhesive [Medical Tape] Rash       REVIEW OF SYSTEMS:  MSK: left shoulder pain   Neuro: WNL   Pertinent items are otherwise noted in HPI. A comprehensive review of systems was otherwise negative. LABS:  HgA1c:   Lab Results   Component Value Date    HGBA1C 6.0 (H) 01/16/2023     BMP: No results found for: "GLUCOSE", "CALCIUM", "NA", "K", "CO2", "CL", "BUN", "CREATININE"  CBC: No components found for: "CBC"    _____________________________________________________  PHYSICAL EXAMINATION:  Vital signs: /72   Pulse 73   Ht 5' 1" (1.549 m)   Wt 71.4 kg (157 lb 6.4 oz)   BMI 29.74 kg/m²   General: No acute distress, awake and alert  Psychiatric: Mood and affect appear appropriate  HEENT: Trachea Midline, No torticollis, no apparent facial trauma  Cardiovascular: No audible murmurs;  Extremities appear perfused  Pulmonary: No audible wheezing or stridor  Skin: No open lesions; see further details (if any) below      MUSCULOSKELETAL EXAMINATION:  Left shoulder examination:  - Patient sitting comfortably in the office in no apparent distress   - No acute visible abnormalities present in the left shoulder.   -Diffuse tenderness palpation throughout the left shoulder, predominantly over the lateral and anterior aspect  -Patient able to actively forward flex to 170 and abduct to 170 degrees. Passive internal/external rotation limited secondary to pain  -5 out of 5 strength in all planes of motion with pain  - NV intact    _____________________________________________________  STUDIES REVIEWED:  I personally reviewed the images and interpretation is as follows:  N/A         PROCEDURES PERFORMED:  Large joint arthrocentesis: L glenohumeral  Universal Protocol:  Consent: Verbal consent obtained. Risks and benefits: risks, benefits and alternatives were discussed  Consent given by: patient  Patient understanding: patient states understanding of the procedure being performed  Site marked: the operative site was marked  Patient identity confirmed: verbally with patient    Supporting Documentation  Indications: pain   Procedure Details  Location: shoulder - L glenohumeral  Needle size: 22 G  Ultrasound guidance: no  Approach: posterior  Medications administered: 2 mL bupivacaine 0.25 %; 12 mg betamethasone acetate-betamethasone sodium phosphate 6 (3-3) mg/mL    Patient tolerance: patient tolerated the procedure well with no immediate complications  Dressing:  Sterile dressing applied                    Floridalma Alexander PA-C - assisting  Toni Nolnad MD                        Portions of the record may have been created with voice recognition software. Occasional wrong word or "sound a like" substitutions may have occurred due to the inherent limitations of voice recognition software. Read the chart carefully and recognize, using context, where substitutions have occurred.